# Patient Record
Sex: FEMALE | Race: WHITE | Employment: OTHER | URBAN - METROPOLITAN AREA
[De-identification: names, ages, dates, MRNs, and addresses within clinical notes are randomized per-mention and may not be internally consistent; named-entity substitution may affect disease eponyms.]

---

## 2024-11-18 ENCOUNTER — HOSPITAL ENCOUNTER (OUTPATIENT)
Age: 74
Setting detail: THERAPIES SERIES
Discharge: HOME | End: 2024-11-18
Payer: MEDICARE

## 2024-11-18 PROCEDURE — 97140 MANUAL THERAPY 1/> REGIONS: CPT

## 2024-11-18 PROCEDURE — 97161 PT EVAL LOW COMPLEX 20 MIN: CPT

## 2024-11-18 NOTE — CONSULTS
[x] [facility]  Outpatient Physical Therapy  [address]   Phone: [phone]  Fax: [phone]       Physical Therapy Evaluation    Date:  2024  Patient: Joselyn Davis  : 1950  MRN: [id number]  Physician: Jam Salguero DO   Insurance: Medicare/Aetna supp. (No auth req'd, BMN)  Medical Diagnosis: Pain in right knee [M25.561], Other chronic pain [G89.29]     Rehab Codes: M25.561, M25.551  Onset Date: 2024                                 Next 's appt: Unknown    Subjective: This pt describes tripping over her dog's toy while running a sweeper in her home on 2024. She states she fell to the floor landing on the posterolateral aspect of R hip and on top of the toy. Since then she has had pain in the lateral R knee, R I-T band and posterolateral R hip area. She describes pain increase and difficulty with getting in/out of a car, prolonged walking and lying on her R side to sleep. Her sx intensity seems to increase as the day progresses.      PMHx/Comorbidities:  [x] refer to full medical chart in [facility] [] Unremarkable    [] Pacemaker [] Cancer [x] Arthritis   [] MI/Heart Problems [] Diabetes [x] HTN   [] Obesity [] Dialysis  [x] Other: Hx of RTKA   [] Asthma/COPD [] Dementia [] Other:   [] Stroke [] Sleep apnea [] Other:   [] Vascular disease [] Rheumatic disease [] Other:     Medications: [x] Refer to full medical record [] None [] Other:  Allergies:      [x] Refer to full medical record  [] None [] Other:    Tests / Imaging: n/a    Function:    Patient lives with:  self   In what type of home [x]  One story   [] Two story   [] Multi-level   Number of stairs to enter  1   With handrail on the []  Right to enter   [] Left to enter   Primary Bed/Bath  1   Bathroom has a []  Tub only  [] Tub/shower combo   [x] Walk in shower    []  Grab bars   Washing machine is on [x]  Main level   [] Second level   [] Basement  [] N/A     ADL/IADL [x] Previously independent with all [x]

## 2024-11-19 ENCOUNTER — HOSPITAL ENCOUNTER (OUTPATIENT)
Age: 74
Setting detail: THERAPIES SERIES
Discharge: HOME | End: 2024-11-19
Payer: MEDICARE

## 2024-11-19 PROCEDURE — 97140 MANUAL THERAPY 1/> REGIONS: CPT

## 2024-11-19 PROCEDURE — 97110 THERAPEUTIC EXERCISES: CPT

## 2024-11-19 NOTE — FLOWSHEET NOTE
[x] [facility]  Outpatient Rehabilitation &  Therapy  [address]  P: [phone]  F: [phone]              Physical Therapy Daily Treatment Note    Date:  2024  Patient Name:  Joselyn Davis    :  1950  MRN: [id number]  Physician: Jam Salguero DO      Insurance: Medicare/Aetna supp. (No auth req'd, BMN)   Medical Diagnosis: Pain in right knee [M25.561], Other chronic pain [G89.29]   Rehab Codes: M25.561, M25.551    Onset Date: 2024     Next  Appt: Unknown   Visit# / total visits: 2/15; Progress note for Medicare patient due at visit 10     Cancels/No Shows: 0/0    Subjective:   Pt felt less pain and improved comfort in amb after initial session.  Pain:  [x] Yes  [] No Location: R greater trochanter/I-T band   Pain Rating: (0-10 scale) 5/10  Pain altered Tx:  [x] No  [] Yes  Action:  Comments: R fibular head sx have decreased    Objective: Pt remains painful to palpation over R greater trochanter, mid R I-T band and some at fibular head.  Modalities: Ice to lateral R hip x 10 mins to end session.  Precautions:0  Exercises:  Exercise Reps/ Time Weight/ Level Comments NP=Not  Performed   Nustep seat 7, arms 9 5 mins L4            Heel/toe raises 15 reps  At [facility]           Calf stretches on [facility] base 5 x 15 secs each      2-way hip  10 reps  L&R           1/4 squats 10 reps             Hookying clamshells   With T-band resistance 2 x 10  Blue band            Axial traction RLE  2 x 1 mins Grade III     STM R I-T band and lat hip 8 mins Grade III Rolling pin, testured green ball, manual kneading                                                     Other:  Exercises:  URL: [email or web address]  Date: 2024  Prepared by: David Eddy     Program Notes  - You may use ice or heat on alisa side of alisa right hip and right knee as much as you want.- Keep the stretching intensity moderate.     Exercises  - Seated Piriformis Stretch (Mirrored)  - 2 x

## 2024-11-21 ENCOUNTER — HOSPITAL ENCOUNTER (OUTPATIENT)
Age: 74
Setting detail: THERAPIES SERIES
Discharge: HOME | End: 2024-11-21
Payer: MEDICARE

## 2024-11-21 PROCEDURE — 97110 THERAPEUTIC EXERCISES: CPT

## 2024-11-21 PROCEDURE — 97140 MANUAL THERAPY 1/> REGIONS: CPT

## 2024-11-21 NOTE — FLOWSHEET NOTE
[x] [facility]  Outpatient Rehabilitation &  Therapy  [address]  P: [phone]  F: [phone]              Physical Therapy Daily Treatment Note    Date:  2024  Patient Name:  Joselyn Davis    :  1950  MRN: [id number]  Physician: Jam Salguero DO      Insurance: Medicare/Aetna supp. (No auth req'd, BMN)   Medical Diagnosis: Pain in right knee [M25.561], Other chronic pain [G89.29]   Rehab Codes: M25.561, M25.551    Onset Date: 2024     Next  Appt: Unknown   Visit# / total visits: 3/15; Progress note for Medicare patient due at visit 10     Cancels/No Shows: 0/0    Subjective:   Pt reports minimal R hip sx today, but the R knee feels like there is a rubber band around it.  Pain:  [x] Yes  [] No Location: R greater trochanter/I-T band   Pain Rating: (0-10 scale) 5/10  Pain altered Tx:  [x] No  [] Yes  Action:  Comments: Gait is not antalgic today    Objective: Pt remains painful to palpation over R I-T band just superior to R knee.  Modalities: Ice to lateral R hip x 10 mins to end session.  Precautions:0  Exercises:  Exercise Reps/ Time Weight/ Level Comments NP=Not  Performed   Nustep seat 7, arms 9 5 mins L4            Heel/toe raises 15 reps  At [facility]           Calf stretches [facility] x 15 secs each      2-way hip  3 x 5  reps 3# L&R           Leg press - bilat 3 x 10 reps L7 Sled 2    LAQs 20 reps  5#     Hookying clamshells   With T-band resistance 2 x 10  Blue band     Manual R hamstring stretches 5 x 15 secs again      Axial traction RLE  2 x 1 mins Grade III     STM R I-T band and lat hip 8 mins Grade III Rolling pin, testured green ball, manual kneading                                                     Other:  Exercises:  URL: [email or web address]  Date: 2024  Prepared by: David Eddy     Program Notes  - You may use ice or heat on the side of the right hip and right knee as much as you want.- Keep the stretching

## 2024-11-26 ENCOUNTER — HOSPITAL ENCOUNTER (OUTPATIENT)
Age: 74
Setting detail: THERAPIES SERIES
Discharge: HOME | End: 2024-11-26
Payer: MEDICARE

## 2024-11-26 PROCEDURE — 97110 THERAPEUTIC EXERCISES: CPT

## 2024-11-26 PROCEDURE — 97140 MANUAL THERAPY 1/> REGIONS: CPT

## 2024-11-26 NOTE — FLOWSHEET NOTE
[x] [facility]  Outpatient Rehabilitation &  Therapy  [address]  P: [phone]  F: [phone]              Physical Therapy Daily Treatment Note    Date:  2024  Patient Name:  Joselyn Davis    :  1950  MRN: [id number]  Physician: Jam Salguero DO      Insurance: Medicare/Aetna supp. (No auth req'd, BMN)   Medical Diagnosis: Pain in right knee [M25.561], Other chronic pain [G89.29]   Rehab Codes: M25.561, M25.551    Onset Date: 2024     Next  Appt: Unknown   Visit# / total visits: 4/15; Progress note for Medicare patient due at visit 10     Cancels/No Shows: 0/0    Subjective:   Pt states her R knee stiffness / pain is worse than her R hip, but her main pain is over R L4/5 and SI area.  Pain:  [x] Yes  [] No Location: R low back, greater trochanter/I-T band   Pain Rating: (0-10 scale) 4/10 for back and hip, 6/10 for knee.  Pain altered Tx:  [x] No  [] Yes  Action:  Comments: Gait is not antalgic today    Objective: Pt remains painful to palpation over R I-T band just superior to R knee.  Modalities: Ultrasound over the R L3-SI area x 6 mins @ 1.2 w/cm2   Precautions:0  Exercises:  Exercise Reps/ Time Weight/ Level Comments NP=Not  Performed   Nustep seat 7, arms 9 5 mins L4            Heel/toe raises 15 reps  At [facility]           Calf stretches on [facility] base 5 x 15 secs each      2-way hip  3 x 5  reps 3# L&R           Leg press - bilat 3 x 10 reps L6 Sled 2    LAQs 20 reps  5#  NP   Hookying clamshells   With T-band resistance 2 x 10  Blue band     Manual R hamstring stretches 5 x 15 secs again   NP   Axial traction RLE  2 x 1 mins Grade III     STM R low back, I-T band and lat hip 8 mins Grade III Rolling pin, testured green ball, manual kneading                                                     Other:  Exercises:  URL: [email or web address]  Date: 2024  Prepared by: David Eddy     Program Notes  - You may use ice or heat on the side

## 2024-12-03 ENCOUNTER — HOSPITAL ENCOUNTER (OUTPATIENT)
Age: 74
Setting detail: THERAPIES SERIES
Discharge: HOME | End: 2024-12-03
Payer: MEDICARE

## 2024-12-03 PROCEDURE — 97140 MANUAL THERAPY 1/> REGIONS: CPT

## 2024-12-03 PROCEDURE — 97110 THERAPEUTIC EXERCISES: CPT

## 2024-12-03 NOTE — FLOWSHEET NOTE
[x] [facility]  Outpatient Rehabilitation &  Therapy  [address] [facility]  P: [phone]  F: [phone]              Physical Therapy Daily Treatment Note    Date:  12/3/2024  Patient Name:  Joselyn Davis    :  1950  MRN: [id number]  Physician: Jam Salguero DO      Insurance: Medicare/Aetna supp. (No auth req'd, BMN)   Medical Diagnosis: Pain in right knee [M25.561], Other chronic pain [G89.29]   Rehab Codes: M25.561, M25.551    Onset Date: 2024     Next  Appt: Unknown   Visit# / total visits: 5/15; Progress note for Medicare patient due at visit 10     Cancels/No Shows: 0/0    Subjective:   Pt reports little to no sx today for her low back and R hip/LE. Pt is encouraged by how well she feels.   Pain:  [x] Yes  [] No Location: R low back, greater trochanter/I-T band   Pain Rating: (0-10 scale) 4/10 for back and hip, 6/10 for knee.  Pain altered Tx:  [x] No  [] Yes  Action:  Comments: Gait is not antalgic today    Objective: Pt is minimally painful to palpation over R I-T band, but is tender to palpation over R?L L3 and L4 paraspinals  Modalities: 0  Precautions:0  Exercises:  Exercise Reps/ Time Weight/ Level Comments NP=Not  Performed   Nustep seat 7, arms 9 5 mins L4            [facility]el/toe [facility]  [facility]           Calf stretches on stairmaster base 5 x 15 secs each      2-way hip  3 x 5  reps 3# L&R           Leg press - bilat 3 x 10 reps L6.5 Sled 2    LAQs 20 reps  5#     Hookying clamshells   With T-band resistance 2 x 10  Blue band     Manual R hamstring stretches 5 x 15 secs again   NP   Axial traction RLE  2 x 1 mins Grade III     STM R low back, I-T band and lat hip 8 mins Grade III Rolling pin, testured green ball, manual kneading                                                     Other:  Exercises:  URL: [email or web address]  Date: 2024  Prepared by: David Eddy     Program Notes  - You may use ice or heat on the side of the right hip

## 2024-12-05 ENCOUNTER — APPOINTMENT (OUTPATIENT)
Age: 74
End: 2024-12-05
Payer: MEDICARE

## 2024-12-10 ENCOUNTER — HOSPITAL ENCOUNTER (OUTPATIENT)
Age: 74
Setting detail: THERAPIES SERIES
Discharge: HOME | End: 2024-12-10
Payer: MEDICARE

## 2024-12-10 PROCEDURE — 97110 THERAPEUTIC EXERCISES: CPT

## 2024-12-10 PROCEDURE — 97140 MANUAL THERAPY 1/> REGIONS: CPT

## 2024-12-10 NOTE — FLOWSHEET NOTE
[x] [facility]  Outpatient Rehabilitation &  Therapy  [address] [facility] [address] [facility]  P: [phone]  F: [phone]              Physical Therapy Daily Treatment Note    Date:  12/10/2024  Patient Name:  Joselyn Davis    :  1950  MRN: [id number]  Physician: Jam Salguero DO      Insurance: Medicare/Aetna supp. (No auth req'd, BMN)   Medical Diagnosis: Pain in right knee [M25.561], Other chronic pain [G89.29]   Rehab Codes: M25.561, M25.551    Onset Date: 2024     Next  Appt: Unknown   Visit# / total visits: 6/15; Progress note for Medicare patient due at visit 10     Cancels/No Shows: 0/0    Subjective:   Pt reports general stiffness today due to weather changes. R knee pain is minimally improved with PT so far, but every day is different regarding sx. R piriformis area is  to palpation  Pain:  [x] Yes  [] No Location: R low back, greater trochanter/I-T band   Pain Rating: (0-10 scale) 4/10 for R buttock, 3/10 for knee.  Pain altered Tx:  [x] No  [] Yes  Action:  Comments: R piriformis stretches    Objective: Pt is minimally painful to palpation over R I-T band, but is tender to palpation over R &L L3 and L4 paraspinals and R piriformis  Modalities: Ice to R piriformis area x 10 mins to end session.  Precautions:0  Exercises:  Exercise Reps/ Time Weight/ Level Comments NP=Not  Performed   Nustep seat 7, arms 9 5 mins L4            Heel/toe raises 15 reps  At [facility]           Calf stretches on [facility] x 15 secs each      2-way hip  3 x 5  reps 3# L&R           Leg press - bilat 3 x 10 reps L6.5 Sled 2    LAQs 20 reps  5#     Hookying clamshells   With T-band resistance 2 x 10  Blue band     Manual R hamstring stretches 5 x 15 secs again   NP   Axial traction RLE  2 x 1 mins Grade III     STM R low back, I-T band and lat hip 8 mins Grade III Rolling pin, testured green ball, manual kneading

## 2024-12-12 ENCOUNTER — HOSPITAL ENCOUNTER (OUTPATIENT)
Age: 74
Setting detail: THERAPIES SERIES
Discharge: HOME | End: 2024-12-12
Payer: MEDICARE

## 2024-12-12 PROCEDURE — 97110 THERAPEUTIC EXERCISES: CPT

## 2024-12-12 PROCEDURE — 97140 MANUAL THERAPY 1/> REGIONS: CPT

## 2024-12-12 NOTE — FLOWSHEET NOTE
Continue current frequency toward long and short term goals x 1 session on 12/23/2024.  [x] Specific Instructions for subsequent treatments: Manual therapy, therapeutic exercise and pain reduction modalities to reduce RLE pain       Time In: 0930     Time Out: 1015    Electronically signed by:  David Eddy, PT

## 2024-12-23 ENCOUNTER — HOSPITAL ENCOUNTER (OUTPATIENT)
Age: 74
Setting detail: THERAPIES SERIES
Discharge: HOME | End: 2024-12-23
Payer: MEDICARE

## 2024-12-23 PROCEDURE — 97140 MANUAL THERAPY 1/> REGIONS: CPT

## 2024-12-23 PROCEDURE — 97110 THERAPEUTIC EXERCISES: CPT

## 2024-12-23 NOTE — FLOWSHEET NOTE
[x] [facility]  Outpatient Rehabilitation &  Therapy  [address]  P: [phone]  F: [phone]              Physical Therapy Daily Treatment Note    Date:  2024  Patient Name:  Joselyn Davis    :  1950  MRN: [id number]  Physician: Jam Salguero DO      Insurance: Medicare/Aetna supp. (No auth req'd, BMN)   Medical Diagnosis: Pain in right knee [M25.561], Other chronic pain [G89.29]   Rehab Codes: M25.561, M25.551    Onset Date: 2024     Next  Appt: Unknown   Visit# / total visits: 8/15; Progress note for Medicare patient due at visit 10     Cancels/No Shows: 0/0    Subjective:   Pt states this is her last day scheduled for PT. She wants to hold future PT. Pt reports R knee pain is over the distal quads and she is quite painful over bilat lumbar paraspinals, R S-I area and posterolateral L hip  Pain:  [x] Yes  [] No Location: R low back, greater trochanter/I-T band   Pain Rating: (0-10 scale) 4/10 for R buttock, 3/10 for knee and 5/10 for low back  Pain altered Tx:  [x] No  [] Yes  Action:  Comments: Piriformis  stretch in R hip adduction/flexion increases pain.     Objective: Scouring test for R hip is positive, R knee pain is reduced by manual traction of RLE.   Modalities: Ice to R piriformis area x 10 mins to end session.  Precautions:0  Exercises:  Exercise Reps/ Time Weight/ Level Comments NP=Not  Performed   Nustep seat 7, arms 9 5 mins L4            Heel/toe raises 15 reps  At [facility]master           Calf stretches on stairmaster base 5 x 15 secs each      2-way hip - SLR and side lying abd. 3 x 5  reps 3# L  Active R L&R           Leg press - bilat 3 x 10 reps L6.5 Sled 2    LAQs 30 reps  4#     Hookying clamshells   With T-band resistance 2 x 10  Blue band     Manual R hamstring stretches 5 x 15 secs again   NP   Axial traction RLE  2 x 1 mins Grade III     STM R low back, I-T band and lat hip 8 mins Grade III Rolling pin, testured green ball, manual

## 2025-01-15 ENCOUNTER — OFFICE VISIT (OUTPATIENT)
Age: 75
End: 2025-01-15

## 2025-01-15 VITALS
SYSTOLIC BLOOD PRESSURE: 133 MMHG | OXYGEN SATURATION: 100 % | HEART RATE: 95 BPM | DIASTOLIC BLOOD PRESSURE: 78 MMHG | WEIGHT: 159.2 LBS

## 2025-01-15 DIAGNOSIS — Z12.31 ENCOUNTER FOR SCREENING MAMMOGRAM FOR MALIGNANT NEOPLASM OF BREAST: ICD-10-CM

## 2025-01-15 DIAGNOSIS — Z01.419 WELL WOMAN EXAM WITH ROUTINE GYNECOLOGICAL EXAM: Primary | ICD-10-CM

## 2025-01-15 DIAGNOSIS — Z78.0 MENOPAUSE: ICD-10-CM

## 2025-01-15 PROCEDURE — 99024 POSTOP FOLLOW-UP VISIT: CPT

## 2025-01-15 RX ORDER — PREDNISONE 20 MG/1
20 TABLET ORAL 2 TIMES DAILY
COMMUNITY
Start: 2025-01-14 | End: 2025-01-20

## 2025-01-15 RX ORDER — SERTRALINE HYDROCHLORIDE 25 MG/1
25 TABLET, FILM COATED ORAL DAILY
COMMUNITY
Start: 2024-08-19

## 2025-01-15 RX ORDER — TELMISARTAN 80 MG/1
1 TABLET ORAL DAILY
COMMUNITY
Start: 2024-02-16

## 2025-01-15 RX ORDER — ROSUVASTATIN CALCIUM 20 MG/1
20 TABLET, COATED ORAL EVERY MORNING
COMMUNITY
Start: 2024-11-25

## 2025-01-15 RX ORDER — GABAPENTIN 100 MG/1
200 CAPSULE ORAL
COMMUNITY

## 2025-01-15 RX ORDER — CETIRIZINE HYDROCHLORIDE 10 MG/1
10 TABLET ORAL DAILY
COMMUNITY

## 2025-01-15 RX ORDER — FOLIC ACID/MULTIVIT,IRON,MINER 0.4MG-18MG
TABLET ORAL DAILY
COMMUNITY

## 2025-01-15 RX ORDER — LEVOTHYROXINE SODIUM 50 UG/1
1 TABLET ORAL EVERY MORNING
COMMUNITY
Start: 2024-01-29

## 2025-01-15 ASSESSMENT — ENCOUNTER SYMPTOMS: GASTROINTESTINAL NEGATIVE: 1

## 2025-01-15 NOTE — PROGRESS NOTES
Absorptiometry)    Findings:    PA Lumbar Spine:  BMD:   1.172 g/cm2.    T-score: -0.2      Left Femoral neck:  BMD   0.908 g/cm2.    T-score: -0.9    Right Femoral neck:  BMD   0.896 g/cm2.    T-score: -1.0    Fracture Risk:  According to FRAX, 10 year probability of any major osteoporosis-related fracture is 9.3%, 10 year probability of hip fracture is 1.2 %    IMPRESSION:  *  Normal exam.    __________________________________________________________________  PLEASE NOTE  *  T-score compares patient BMD to a reference of young normal controls.    World Health Organization Classification:  Osteoporosis:                          T-score=-2.5 or below.  Osteopenia (low bone mass):  T-score between -1.0 and -2.5.  Normal:                                    T-score -1.0 or above.    Secondary causes of bone loss should be evaluated if clinically indicated since the etiology of low BMD cannot be determined by BMD measurement alone.    Workstation:[id number]    Finalized by Iggy Lin MD on 1/26/2024 2:10 PM    Colon cancer screening: No colonoscopy on file   No cologuard on file  No FIT/FOBT on file   No flexible sigmoidoscopy on file      Screenings due will be ordered for this visit per patient request.    ROS:  Review of Systems   Gastrointestinal: Negative.    Genitourinary: Negative.    All other systems reviewed and are negative.       All other systems negative.    PE:  /78 (Site: Right Upper Arm, Position: Sitting, Cuff Size: Medium Adult)   Pulse 95   Wt 72.2 kg (159 lb 3.2 oz)   SpO2 100%     Physical Exam  Constitutional:       Appearance: Normal appearance. She is normal weight.   HENT:      Head: Normocephalic and atraumatic.      Right Ear: External ear normal.      Left Ear: External ear normal.      Nose: Nose normal.      Mouth/Throat:      Mouth: Mucous membranes are moist.      Pharynx: Oropharynx is clear.   Eyes:      Extraocular Movements: Extraocular movements intact.

## 2025-01-29 LAB — MAMMOGRAPHY, EXTERNAL: NORMAL

## 2025-05-13 ENCOUNTER — HOSPITAL ENCOUNTER (OUTPATIENT)
Age: 75
Setting detail: THERAPIES SERIES
Discharge: HOME | End: 2025-05-13
Payer: MEDICARE

## 2025-05-13 PROCEDURE — 97161 PT EVAL LOW COMPLEX 20 MIN: CPT

## 2025-05-13 PROCEDURE — 97110 THERAPEUTIC EXERCISES: CPT

## 2025-05-13 NOTE — CONSULTS
for R shldr ROM and strength    LTG: (to be met in 20 treatments)  Pt will report 2/10 max R shldr pain during her normal ADL function  Pt will demonstrate R shldr AROM equal to L.  Pt will demonstrates R shldr gross strength equal to L shldr.        Patient goals: \"able to use right arm better\"    Rehab Potential:  [x] Good  [] Fair  [] Poor   Suggested Professional Referral:  [x] No  [] Yes:  Barriers to Goal Achievement::  [x] No  [] Yes:  Domestic Concerns:  [x] No  [] Yes:    Treatment Plan:  [x] Therapeutic Exercise   12702  [x] Vasopneumatic cold with compression  92788    [] Therapeutic Activity  23807 [x] Cold/hotpack    [] Gait Training   12094 [] Lumbar/Cervical Traction  71351   [x] Neuromuscular Re-education  93968 [x] Electrical Stimulation Unattended  40807   [x] Manual Therapy    76177 [x] Ultrasound  35248   [] Iontophoresis: 4 mg/mL Dexamethasone Sodium Phosphate  mAmin  60470 []  Medication allergies reviewed for use of   Dexamethasone Sodium Phosphate 4mg/ml with iontophoresis treatments.Pt is not allergic.       Frequency:  2 x/week for 20 visits    Today’s Treatment: Initial evaluation, Manual stretches for R shldr ROM, Instruction in home ex rx per [employer] handout listed below and provided to patient.  Precautions:hx of 2 falls, both tripping over objects.  Modalities: 0  Exercises: to be prescribed next OP PT session  Exercise Reps/ Time Weight/ Level Comments                                       Home ex rx:  Access Code: [email or web address]  URL: [email or web address]  Date: 05/13/2025  Prepared by: David Eddy    Exercises  - Circular Shoulder Pendulum with Table Support  - 1-2 x daily - 7 x weekly - 1 sets - 15 reps - n/a hold  - Seated Shoulder Flexion Towel Slide at Table Top  - 1-2 x daily - 7 x weekly - 1 sets - 10 reps - 5 secs. hold  - Seated Shoulder Abduction Towel Slide at Table Top  - 1-2 x daily - 7 x weekly - 1 sets - 10 reps - 5 secs. hold  - Seated Shoulder

## 2025-05-19 ENCOUNTER — HOSPITAL ENCOUNTER (OUTPATIENT)
Age: 75
Setting detail: THERAPIES SERIES
Discharge: HOME | End: 2025-05-19
Payer: MEDICARE

## 2025-05-19 PROCEDURE — 97110 THERAPEUTIC EXERCISES: CPT

## 2025-05-19 NOTE — FLOWSHEET NOTE
Abduction Towel Slide at Table Top  - 1-2 x daily - 7 x weekly - 1 sets - 10 reps - 5 secs. hold  - Seated Shoulder External Rotation AAROM with Cane and Hand in Neutral  - 1-2 x daily - 7 x weekly - 1 sets - 10 reps - 5 secs. hold  - Standing Shoulder Extension with Dowel  - 1-2 x daily - 7 x weekly - 1 sets - 10 reps - 5 secs. hold  - Seated Scapular Retraction  - 2-3 x daily - 7 x weekly - 1 sets - 10 reps - 5 secs hold    Treatment Charges: Mins Units   [x]  Modalities-ice 10 0   [x]  Ther Exercise 39 3   []  Manual Therapy     []  Ther Activities     []  Neuro Re-ed     []  Vasocompression     [] Gait     []  Other     Total Billable time 39 3   Medicare Expenditure: $190    Pt's reaction to tx: Pt reports her R shldr feels better as it moves, but pain does increase at ends of range.    Assessment: [x] Progressing toward goals. R shldr ROM limitations due to pain and mild capsular tightness persist. Pt is able to function in her ADL fairly well if she avoids painful motion.    [] No change.     [] Other:  [x] Patient would continue to benefit from skilled physical therapy services in order to reduce pain, increase ROM/strength and return pt to baseline function in ADL.   Problem list  Pain, R shldr  Stiffness R shldr  R shldr/UE strength deficits.    STG (to be met in 10 treatments)  Pt will report 3/10 maximum R shldr pain during her normal ADL with a 25% decrewase in sx frequency.  Pt will demonstrate AAROM of R shldr equal to L shldr to advance function in ADL  Pt will demonstrate gross R shldr strength of 4/5 or higher to facilitate function in ADL  Pt will be independent in her home ex rx for R shldr ROM and strength    LTG (to be met in 20 treatments)  Pt will report 2/10 max R shldr pain during her normal ADL function  Pt will demonstrate R shldr AROM equal to L.  Pt will demonstrates R shldr gross strength equal to L shldr.    Pt. Education:  [x] Yes  [] No  [x] Reviewed Prior HEP/Ed  Method of

## 2025-05-22 ENCOUNTER — HOSPITAL ENCOUNTER (OUTPATIENT)
Age: 75
Setting detail: THERAPIES SERIES
Discharge: HOME | End: 2025-05-22
Payer: MEDICARE

## 2025-05-22 PROCEDURE — 97110 THERAPEUTIC EXERCISES: CPT

## 2025-05-22 NOTE — FLOWSHEET NOTE
[x] [facility]  Outpatient Rehabilitation &  Therapy  [address]  P: [phone]  F: [phone]              Physical Therapy Daily Treatment Note    Date:  2025  Patient Name:  Joselyn Davis    :  1950  MRN: [id number]  Physician: Jam Salguero DO      Insurance: Medicare / AETNA Medicare Supplement (No auth req'd, $0 copay, Deductible met, visits BMN, No medicare $$ spent in )   Medical Diagnosis: M25.511- R shldr pain   Rehab Codes: M25.611, S46.011, Z91.81, M75,41    Onset Date: 2025    Next  Appt: Unknown   Visit# / total visits: 3/20; Progress note for Medicare patient due at visit 10     Cancels/No Shows: 0/0    Subjective:   R shldr discomfort is mainly at night. This a.m. R shldr sx are less than usual and turning the steering wheel is easier, less painful.  Pain:  [x] Yes  [] No Location: R GH joint  Pain Rating: (0-10 scale) 3/10 at rest  Pain altered Tx:  [x] No  [] Yes  Action:  Comments:    Objective: R upper trap and belly of supraspinatus muscle are tender to palpation.  Modalities: Ice to R GH joint x 10 mins to end session  Precautions:  Exercises:  Exercise Reps/ Time Weight/ Level Comments   UBE fwd/rev/fwd 3 mins     ROM pulleys  2 mins  5 second stretches   [facility] slides RUE 2 x 10 reps  Shldr flexion stretches   Shldr extension with T-tube 2 x 12 reps Green    R shldr ER with T-tube  2 x 10 reps  Red          Biceps curls 2 x 10 reps 4#          Supine bench press  2 x 10 reps 5# wand    L sidelying R shldr ER 2 x 10 reps active    L sidelying R shldr abduction to 90 degrees 2 x 10 reps  active          AA/PROM 8 mins  Cardinal planes of motion         Passive R upper trap stretches. 5 x 10 secs each                                   Other:  Home ex rx:  Access Code: [email or web address]  URL: [email or web address]  Date: 2025  Prepared by: David Eddy     Exercises  - Circular Shoulder Pendulum with Table Support  - 1-2 x daily

## 2025-05-27 ENCOUNTER — HOSPITAL ENCOUNTER (OUTPATIENT)
Age: 75
Setting detail: THERAPIES SERIES
Discharge: HOME | End: 2025-05-27
Payer: MEDICARE

## 2025-05-27 PROCEDURE — 97110 THERAPEUTIC EXERCISES: CPT

## 2025-05-27 NOTE — FLOWSHEET NOTE
[x] [facility]  Outpatient Rehabilitation &  Therapy  [address]  P: [phone]  F: [phone]              Physical Therapy Daily Treatment Note    Date:  2025  Patient Name:  Joselyn Davis    :  1950  MRN: [id number]  Physician: Jam Salguero DO      Insurance: Medicare / AETNA Medicare Supplement (No auth req'd, $0 copay, Deductible met, visits BMN, No medicare $$ spent in )   Medical Diagnosis: M25.511- R shldr pain   Rehab Codes: M25.611, S46.011, Z91.81, M75,41    Onset Date: 2025    Next  Appt: Unknown   Visit# / total visits: ; Progress note for Medicare patient due at visit 10     Cancels/No Shows: 0/0    Subjective:   R shldr is tired and sore from working at home. Reaching overhead is getting easier, but still not back to pt's normal.  Pain:  [x] Yes  [] No Location: R GH joint  Pain Rating: (0-10 scale) 3/10 at rest  Pain altered Tx:  [x] No  [] Yes  Action:  Comments:    Objective: R upper trap and belly of supraspinatus muscle are tender to palpation.  Modalities: Ice to R GH joint x 10 mins to end session  Precautions:  Exercises:  Exercise Reps/ Time Weight/ Level Comments   UBE fwd/rev/fwd 3 mins     ROM pulleys  2 mins  5 second stretches   [facility] RUE 2 x 10 reps  Shldr flexion stretches   Shldr extension with T-tube 2 x 12 reps Green    R shldr ER with T-tube  2 x 10 reps  Red          Biceps curls 2 x 10 reps 4#          Supine bench press  2 x 10 reps 5# wand    L sidelying R shldr ER 2 x 10 reps 2#    L sidelying R shldr abduction to 90 degrees 2 x 10 reps  active          AA/PROM 8 mins  Cardinal planes of motion         Passive R upper trap stretches. 5 x 10 secs each                                   Other:  Home ex rx:  Access Code: [email or web address]  URL: [email or web address]  Date: 2025  Prepared by: David Eddy     Exercises  - Circular Shoulder Pendulum with Table Support  - 1-2 x daily - 7 x weekly - 1

## 2025-05-30 ENCOUNTER — HOSPITAL ENCOUNTER (OUTPATIENT)
Age: 75
Setting detail: THERAPIES SERIES
Discharge: HOME | End: 2025-05-30
Payer: MEDICARE

## 2025-05-30 PROCEDURE — 97110 THERAPEUTIC EXERCISES: CPT

## 2025-05-30 NOTE — FLOWSHEET NOTE
1-2 x daily - 7 x weekly - 1 sets - 15 reps - n/a hold  - Seated Shoulder Flexion Towel Slide at Table Top  - 1-2 x daily - 7 x weekly - 1 sets - 10 reps - 5 secs. hold  - Seated Shoulder Abduction Towel Slide at Table Top  - 1-2 x daily - 7 x weekly - 1 sets - 10 reps - 5 secs. hold  - Seated Shoulder External Rotation AAROM with Cane and Hand in Neutral  - 1-2 x daily - 7 x weekly - 1 sets - 10 reps - 5 secs. hold  - Standing Shoulder Extension with Dowel  - 1-2 x daily - 7 x weekly - 1 sets - 10 reps - 5 secs. hold  - Seated Scapular Retraction  - 2-3 x daily - 7 x weekly - 1 sets - 10 reps - 5 secs hold    Treatment Charges: Mins Units   [x]  Modalities-ice 10 0   [x]  Ther Exercise 40 3   []  Manual Therapy     []  Ther Activities     []  Neuro Re-ed     []  Vasocompression     [] Gait     []  Other     Total Billable time 40 3   Medicare Expenditure: $407    Pt's reaction to tx: Pt reports R shldr fatigue, but no pain increase post session    Assessment: [x] Progressing toward goals. R shldr AROM improving in ease and strength    [] No change.     [] Other:  [x] Patient would continue to benefit from skilled physical therapy services in order to reduce pain, increase ROM/strength and return pt to baseline function in ADL.   Problem list  Pain, R shldr  Stiffness R shldr  R shldr/UE strength deficits.    STG (to be met in 10 treatments)  Pt will report 3/10 maximum R shldr pain during her normal ADL with a 25% decrewase in sx frequency.  Pt will demonstrate AAROM of R shldr equal to L shldr to advance function in ADL  Pt will demonstrate gross R shldr strength of 4/5 or higher to facilitate function in ADL  Pt will be independent in her home ex rx for R shldr ROM and strength    LTG (to be met in 20 treatments)  Pt will report 2/10 max R shldr pain during her normal ADL function  Pt will demonstrate R shldr AROM equal to L.  Pt will demonstrates R shldr gross strength equal to L shldr.    Pt. Education:  [x]

## 2025-06-03 ENCOUNTER — HOSPITAL ENCOUNTER (OUTPATIENT)
Age: 75
Setting detail: THERAPIES SERIES
Discharge: HOME | End: 2025-06-03
Payer: MEDICARE

## 2025-06-03 PROCEDURE — 97110 THERAPEUTIC EXERCISES: CPT

## 2025-06-03 NOTE — FLOWSHEET NOTE
[x] [facility]  Outpatient Rehabilitation &  Therapy  [address]  P: [phone]  F: [phone]              Physical Therapy Daily Treatment Note    Date:  6/3/2025  Patient Name:  Joselyn Davis    :  1950  MRN: [id number]  Physician: Jam Salguero DO      Insurance: Medicare / [employer]ET[employer] Medicare Supplement (No auth req'd, $0 copay, Deductible met, visits BMN, No medicare $$ spent in )   Medical Diagnosis: M25.511- R shldr pain   Rehab Codes: M25.611, S46.011, Z91.81, M75,41    Onset Date: 2025    Next  Appt: Unknown   Visit# / total visits: ; Progress note for Medicare patient due at visit 10     Cancels/No Shows: 0/0    Subjective:   R shldr is improving in pain reduction and function increase in ADL. Pt c/o R knee pain today s/p twisting it this a.m.  Pain:  [x] Yes  [] No Location: R GH joint  Pain Rating: (0-10 scale) 4/10 at rest  Pain altered Tx:  [x] No  [] Yes  Action:  Comments: R shldr pain increases with resisted R humeral IR    Objective: RUE active elevation to 135 today before pain limits motion.  Modalities: Ice to R GH joint x 10 mins to end session  Precautions:  Exercises:  Exercise Reps/ Time Weight/ Level Comments   UBE fwd/rev/fwd 3 mins     ROM pulleys  2 mins  5 second stretches   [facility] RUE 2 x 10 reps  Shldr flexion stretches   Shldr extension with T-tube 2 x 12 reps Green    High rows with t-tube 2 x 10  Green    R shldr ER and IR with T-tube  2 x 10 reps  Green          Biceps curls 2 x 10 reps 4#          Supine bench press  2 x 10 reps 5# wand    L sidelying R shldr ER 2 x 10 reps 2#    L sidelying R shldr abduction to 90 degrees 2 x 10 reps  active          AA/PROM 5 mins  Cardinal planes of motion         Passive R upper trap stretches. 5 x 10 secs each                                   Other:  Home ex rx:  Access Code: [email or web address]  URL: [email or web address]  Date: 2025  Prepared by: David Eddy

## 2025-06-05 ENCOUNTER — HOSPITAL ENCOUNTER (OUTPATIENT)
Age: 75
Setting detail: THERAPIES SERIES
Discharge: HOME | End: 2025-06-05
Payer: MEDICARE

## 2025-06-05 PROCEDURE — 97110 THERAPEUTIC EXERCISES: CPT

## 2025-06-05 NOTE — FLOWSHEET NOTE
[x] [facility]  Outpatient Rehabilitation &  Therapy  [address]  P: [phone]  F: [phone]              Physical Therapy Daily Treatment Note    Date:  2025  Patient Name:  Joselyn Davis    :  1950  MRN: [id number]  Physician: Jam Salguero DO      Insurance: Medicare / AET[employer] Medicare Supplement (No auth req'd, $0 copay, Deductible met, visits BMN, No medicare $$ spent in )   Medical Diagnosis: M25.511- R shldr pain   Rehab Codes: M25.611, S46.011, Z91.81, M75,41    Onset Date: 2025    Next  Appt: Unknown   Visit# / total visits: ; Progress note for Medicare patient due at visit 10     Cancels/No Shows: 0/0    Subjective:   R shldr sx are minor, but pt does feel discomfort due to current rainy weather conditions.  Pain:  [x] Yes  [] No Location: R GH joint  Pain Rating: (0-10 scale) 2/10 at rest  Pain altered Tx:  [x] No  [] Yes  Action:  Comments: R shldr pain increases with resisted R humeral IR    Objective: RUE active elevation to 135 today before pain limits motion.  Modalities: Ice to R GH joint x 10 mins to end session  Precautions:  Exercises:  Exercise Reps/ Time Weight/ Level Comments NP=Not  Performed   UBE fwd/rev/fwd 3 mins      ROM pulleys  2 mins  5 second stretches    [facility] RUE 2 x 10 reps  Shldr flexion stretches    Shldr extension with T-tube 2 x 12 reps Green     High rows with t-tube 2 x 12  Green     R shldr ER and IR with T-tube  2 x 12 reps  Green            Biceps curls 2 x 10 reps 4#            Supine bench press  2 x 10 reps 5# wand     L sidelying R shldr ER 2 x 10 reps 2#     L sidelying R shldr abduction to 90 degrees 2 x 10 reps  active            AROM 3 x 5 reps  Cardinal planes of motion           Passive R upper trap stretches. 5 x 10 secs each                                         Other:  Home ex rx:  Access Code: [email or web address]  URL: [email or web address]  Date: 2025  Prepared by: David Eddy

## 2025-06-12 ENCOUNTER — HOSPITAL ENCOUNTER (OUTPATIENT)
Age: 75
Setting detail: THERAPIES SERIES
Discharge: HOME | End: 2025-06-12
Payer: MEDICARE

## 2025-06-12 PROCEDURE — 97110 THERAPEUTIC EXERCISES: CPT

## 2025-06-12 NOTE — PROGRESS NOTES
[x] [facility]  Outpatient Rehabilitation &  Therapy  [address]  P: [phone]  F: [phone]              Physical Therapy Daily Treatment Note / Progress Note    Date:  2025  Patient Name:  Joselyn Davis    :  1950  MRN: [id number]  Physician: Jam Salguero DO      Insurance: Medicare / AETNA Medicare Supplement (No auth req'd, $0 copay, Deductible met, visits BMN, No medicare $$ spent in )   Medical Diagnosis: M25.511- R shldr pain   Rehab Codes: M25.611, S46.011, Z91.81, M75,41    Onset Date: 2025    Next  Appt: Unknown   Visit# / total visits: 8 and last.     Cancels/No Shows: 0/0    Subjective:   R shldr is almost back to pt's normal. Pt states today will be the last day she wants PT.  Pain:  [x] Yes  [] No Location: R GH joint  Pain Rating: (0-10 scale) 1/10 at rest, 3/10 with overhead reach.  Pain altered Tx:  [x] No  [] Yes  Action:  Comments: R shldr pain increases with resisted R humeral IR    Objective: (2025) RUE AROM = flexion 145 prior to pain increase, Abd 145, ER 65, IR 70   Strength R shldr flex 4/5, ext 4+/5, Abd 4/5 ER 4/5, IR 4/5 with pain   Modalities: Ice to R GH joint x 10 mins to end session  Precautions:0  Exercises:  Exercise Reps/ Time Weight/ Level Comments NP=Not  Performed   UBE fwd/rev/fwd 3 mins      ROM pulleys  2 mins  5 second stretches    [facility] slides RUE 2 x 10 reps  Shldr flexion stretches    Shldr extension with T-tube 2 x 12 reps Green     High rows with t-tube 2 x 12  Green     R shldr ER and IR with T-tube  2 x 12 reps  Green            Biceps curls 2 x 10 reps 4#            Supine bench press  2 x 10 reps 5# wand     L sidelying R shldr ER 2 x 10 reps 2#     L sidelying R shldr abduction to 90 degrees 2 x 10 reps  active            AROM 3 x 5 reps  Cardinal planes of motion           Passive R upper trap stretches. 5 x 10 secs each   NP                                      Other:  Home ex rx:  Access